# Patient Record
(demographics unavailable — no encounter records)

---

## 2017-08-15 NOTE — RADRPT
PROCEDURE:   US Abdomen (right upper quadrant). 

 

CLINICAL INDICATION:  Abdominal pain.

 

TECHNIQUE:   Multiple real-time longitudinal and transverse images of the right upper quadrant of th
e abdomen were acquired utilizing a curved array transducer. Images were reviewed on a high-resoluti
on PACS workstation. 

 

COMPARISON:   None 

 

FINDINGS:

 

The liver is normal in size and echogenicity without focal mass or intrahepatic biliary dilatation. 
 The gallbladder is normal.  There is no pericholecystic fluid or gallbladder wall thickening or gal
lstones. No intra or extrahepatic biliary dilatation is seen.  The common bile duct measures 2.9 mm 
in maximal dimension.  The pancreas is not well visualized.  No free fluid is identified.

 

The right kidney measures 10.7 cm in length.  There is normal echogenicity within the right kidney. 
 There is no perinephric fluid collection.  No hydronephrosis, mass, or calculus is seen.   

 

IMPRESSION:

1.  The pancreas is not well visualized.  Otherwise unremarkable right upper quadrant ultrasound.  

 

RPTAT: HH

_____________________________________________ 

.Chela Menchaca MD, MD           Date    Time 

Electronically viewed and signed by .Chela Menchaca MD, MD on 08/15/2017 20:58 

 

D:  08/15/2017 20:58  T:  08/15/2017 20:58

.N/

## 2017-08-15 NOTE — ERD
ER Documentation


Chief Complaint


Date/Time


DATE: 8/15/17 


TIME: 22:52


Chief Complaint


LUQ x 3 days, worse today "burning" nausea -vomit/diarrhea





HPI


Patient is a 48-year-old female with a past medical history of gastritis who 

presents to the emergency department for concerns of epigastric and left upper 

quadrant pain 3 days.  Patient states pain was worse today.  Patient describes 

the pain to be burning in nature.  Patient reports nausea however she denies 

any vomiting or diarrhea.  Patient denies any radiation of the pain.  Patient 

denies any fevers, chills, chest pain, shortness breath, left upper extremity 

pain, diaphoresis or loss of consciousness.  Patient does report eating fried 

and spicy foods.  Patient denies seeing a GI specialist.  Patient denies any 

endoscopies or colonoscopies.





ROS


All systems reviewed and are negative except as per history of present illness.





Medications


Home Meds


Active Scripts


Famotidine* (Pepcid*) 20 Mg Tablet, 20 MG PO BID for 30 Days, TAB


   Prov:RAFA ARIZMENDI PA-C         8/15/17


Hydrocodone/Acetaminophen (Norco 5-325 Tablet) 1 Each Tablet, 1 TAB PO Q6H Y 

for PAIN, #7 TAB


   Prov:RAFA ARIZMENDI PA-C         8/15/17


Prednisone* (Prednisone*) 20 Mg Tab, 40 MG PO DAILY for 4 Days, TAB


   Prov:SHELBY DESHPANDE MD         16


Hydrocodone/Acetaminophen (Norco 5-325 Tablet) 1 Each Tablet, 1 TAB PO Q6H Y 

for PAIN, #15 TAB


   Prov:SHELBY DESHPANDE MD         16


Amoxicillin/Potassium Clav (Amox-Clav 875-125 mg Tablet) 875-125 mg Tab, 1 TAB 

PO BID for 10 Days, #14 TAB


   Prov:SHELBY DESHPANDE MD         16


Reported Medications


Flurazepam Hcl (Dalmane) 30 Mg Capsule


   6/17/10


Tramadol Hcl* (Ultram*) 50 Mg Tablet, 0 Refills


   6/17/10





Allergies


Allergies:  


Coded Allergies:  


     No Known Allergies (Verified  Allergy, Mild, 14)


     No Known Drug Allergies (Verified  Allergy, Mild, 12)





PMhx/Soc


History of Surgery:  Yes (C SECTIONX2, PARTIAL HYSTERECTOMY)


Anesthesia Reaction:  No


Hx Respiratory Disorders:  No


Hx Cardiac Disorders:  No


Hx Psychiatric Problems:  No


Hx Miscellaneous Medical Probl:  Yes (anemia)


Hx Alcohol Use:  No


Hx Substance Use:  No


Hx Tobacco Use:  No





Physical Exam


Vitals





Vital Signs








  Date Time  Temp Pulse Resp B/P Pulse Ox O2 Delivery O2 Flow Rate FiO2


 


8/15/17 19:11 98.9 86 18 130/70 97   








Physical Exam


GENERAL: Well-developed, well-nourished female. Appears in no acute distress.  

Taking in full sentences


HEAD: Normocephalic, atraumatic. 


EYES: Pupils are equally reactive bilaterally. EOMs grossly intact. No 

conjunctival erythema. 


ENT: Moist mucous membranes. No uvula deviation. No kissing tonsils. 


NECK: Supple. No meningismus. Normal range of motion of the neck.


LUNG: Clear to auscultation bilaterally. No rhonchi, wheezing, rales or coarse 

breath sounds. 


HEART: Regular rate and rhythm. No murmurs, rubs or gallops.


ABDOMEN: Soft and nondistended.  Tender to palpation in the left upper quadrant 

and epigastric region.  Positive bowel sounds in all four quadrants. No rebound 

tenderness, no guarding. (-) McBurney's point tenderness. No CVA tenderness.


EXTREMITIES: Equal pulses bilaterally. No peripheral clubbing, cyanosis or 

edema. No unilateral leg swelling.


NEUROLOGIC: Alert and oriented. Moving all four extremities without any 

difficulty. Normal speech. Steady gait. 


SKIN: Normal color. Warm and dry. No rashes or lesions.


Result Diagram:  


8/15/17 2005                                                                   

             8/15/17 2005





Results 24 hrs





 Laboratory Tests








Test


  8/15/17


20:05


 


White Blood Count 4.010^3/ul 


 


Red Blood Count 4.4310^6/ul 


 


Hemoglobin 13.3g/dl 


 


Hematocrit 38.8% 


 


Mean Corpuscular Volume 87.6fl 


 


Mean Corpuscular Hemoglobin 30.0pg 


 


Mean Corpuscular Hemoglobin


Concent 34.3g/dl 


 


 


Red Cell Distribution Width 11.9% 


 


Platelet Count 44684^3/UL 


 


Mean Platelet Volume 10.3fl 


 


Neutrophils % 48.4% 


 


Lymphocytes % 42.4% 


 


Monocytes % 6.3% 


 


Eosinophils % 2.3% 


 


Basophils % 0.3% 


 


Nucleated Red Blood Cells % 0.0/100WBC 


 


Neutrophils # (Manual) 1.910^3/ul 


 


Lymphocytes # 1.710^3/ul 


 


Monocytes # 0.310^3/ul 


 


Eosinophils # 0.110^3/ul 


 


Basophils # 0.010^3/ul 


 


Nucleated Red Blood Cells # 0.010^3/ul 


 


Sodium Level 138mmol/L 


 


Potassium Level 3.8mmol/L 


 


Chloride Level 103mmol/L 


 


Carbon Dioxide Level 29mmol/L 


 


Anion Gap 10 


 


Blood Urea Nitrogen 12mg/dl 


 


Creatinine 0.85mg/dl 


 


Glucose Level 99mg/dl 


 


Calcium Level 9.4mg/dl 


 


Total Bilirubin 0.2mg/dl 


 


Direct Bilirubin 0.00mg/dl 


 


Indirect Bilirubin 0.2mg/dl 


 


Aspartate Amino Transf


(AST/SGOT) 48IU/L 


 


 


Alanine Aminotransferase


(ALT/SGPT) 61IU/L 


 


 


Alkaline Phosphatase 118IU/L 


 


Troponin I < 0.012ng/ml 


 


Total Protein 7.0g/dl 


 


Albumin 4.2g/dl 


 


Globulin 2.80g/dl 


 


Albumin/Globulin Ratio 1.50 


 


Lipase 82U/L 








 Current Medications








 Medications


  (Trade)  Dose


 Ordered  Sig/Quita


 Route


 PRN Reason  Start Time


 Stop Time Status Last Admin


Dose Admin


 


 Famotidine


  (Pepcid)  20 mg  ONCE  STAT


 PO


   8/15/17 19:48


 8/15/17 19:50 DC 8/15/17 20:11


 


 


 Miscellaneous


 Medication


  (Gi Cocktail (2))  40 ml  ONCE  STAT


 PO


   8/15/17 19:48


 8/15/17 19:50 DC 8/15/17 20:11


 


 


 Ondansetron HCl


  (Zofran Odt)  4 mg  ONCE  STAT


 ODT


   8/15/17 19:48


 8/15/17 19:50 DC 8/15/17 20:11


 











Procedures/MDM


ED COURSE:


The patient was stable throughout ED course. I kept the patient and/or family 

informed of laboratory and diagnostic imaging results throughout the ED course.

  








DIAGNOSTIC IMAGING:


Read by radiologist.





 Patient: CORTNEY BANUELOS   : 1969   Age: 48  Sex: F                        


 MR #:    [id number]   Acct #:   [id number]    DOS: 08/15/17 1948


 Ordering MD: RAFA ARIZMENDI PA-C   Location:  [facility]   Room/Bed:                 

                           


 








PROCEDURE:   US Abdomen (right upper quadrant). 


 


CLINICAL INDICATION:  Abdominal pain.


 


TECHNIQUE:   Multiple real-time longitudinal and transverse images of the right 

upper quadrant of the abdomen were acquired utilizing a curved array 

transducer. Images were reviewed on a high-resolution PACS workstation. 


 


COMPARISON:   None 


 


FINDINGS:


 


The liver is normal in size and echogenicity without focal mass or intrahepatic 

biliary dilatation.  The gallbladder is normal.  There is no pericholecystic 

fluid or gallbladder wall thickening or gallstones. No intra or extrahepatic 

biliary dilatation is seen.  The common bile duct measures 2.9 mm in maximal 

dimension.  The pancreas is not well visualized.  No free fluid is identified.


 


The right kidney measures 10.7 cm in length.  There is normal echogenicity 

within the right kidney.  There is no perinephric fluid collection.  No 

hydronephrosis, mass, or calculus is seen.   


 


IMPRESSION:


1.  The pancreas is not well visualized.  Otherwise unremarkable right upper 

quadrant ultrasound.  


 


RPTAT: HH


_____________________________________________ 


.Chela Menchaca MD, MD           Date    Time 


Electronically viewed and signed by .Chela Menchaca MD, MD on 08/15/2017 20:

58 


 


D:  08/15/2017 20:58  T:  08/15/2017 20:58


.N/





CC: RAFA ARIZMENDI PA-C








MEDICATIONS GIVEN: 


GI cocktail, Pepcid, Zofran


Patient tolerated medication well with no adverse reactions. Patient reported 

improvement in pain. 





MEDICAL DECISION MAKING: 


This is a 48-year-old female who presents with epigastric pain and left upper 

quadrant pain 3 days with nausea.. Vital signs were reviewed. Patient is 

afebrile. CBC showed no evidence of systemic infection or severe anemia.  CMP 

showed no evidence of electrolyte abnormalities, severe acidosis, alkalosis, 

renal failure, or liver disease. Lipase showed no evidence of acute 

pancreatitis.  Troponin was negative.  Gallbladder ultrasound showed  The 

pancreas is not well visualized.  Otherwise unremarkable right upper quadrant 

ultrasound.  atient was given a GI cocktail here in the emergency department.  

Patient did report improvement in pain.  At this time, patient presentation is 

most consistent with epigastric pain of unknown etiology.  Unable to rule out 

any ulcers at this time.  Patient will need to follow-up with a GI specialist 

for an endoscopy on an outpatient basis.  Low suspicion for acute coronary 

syndrome, AAA, mesenteric ischemia, lower lobe pneumonia, DKA, bowel obstruction

, bowel perforation, cholecystitis, choledocholithiasis, ascending cholangitis, 

hepatic abscess, pancreatitis, nephrolithiasis, constipation, pregnancy, 

ectopic pregnancy. 





PRESCRIPTIONS: 


Pepcid, Norco





DISCHARGE:


At this time, patient is stable for discharge and outpatient management.  She 

was given a copy of all imaging studies and blood work obtained today.  Patient 

was advised to follow-up with a GI specialist.  Referral information provided.  

Patient was advised to avoid any fatty, fried, acidic foods.  Patient advised 

to avoid NSAIDs.  Patient advised to avoid alcohol.  I have instructed the 

patient to follow-up with his/her primary care physician in 1-2 days. I have 

instructed the patient to promptly return to the ER at any time for any new or 

worsening symptoms including increased pain, nausea, vomiting, diarrhea, fever, 

weakness or LOC. The patient and/or family expressed understanding of and 

agreement with this plan. All questions were answered. Home care instructions 

were provided. 








Disclaimer: Inadvertent spelling and grammatical errors are likely due to EHR/

dictation software use and do not reflect on the overall quality of patient 

care. Also, please note that the electronic time recorded on this note does not 

necessarily reflect the actual time of the patient encounter.





Departure


Diagnosis:  


 Primary Impression:  


 Epigastric abdominal pain


Condition:  Stable


Patient Instructions:  Epigastric Pain (Uncertain Cause)


Referrals:  


ELOISA SUE MD, RAHUL K. CHITAYAT, RON DESAI, SALEEM A MD EPHRAIM,HILTON KONG MD








[facility]


YOU HAVE RECEIVED A MEDICAL SCREENING EXAM AND THE RESULTS INDICATE THAT YOU DO 

NOT HAVE A CONDITION THAT REQUIRES URGENT TREATMENT IN THE EMERGENCY DEPARTMENT.





FURTHER EVALUATION AND TREATMENT OF YOUR CONDITION CAN WAIT UNTIL YOU ARE SEEN 

IN YOUR DOCTORS OFFICE WITHIN THE NEXT 1-2 DAYS. IT IS YOUR RESPONSIBILITY TO 

MAKE AN APPOINTMENT FOR FOLOW-UP CARE.





IF YOU HAVE A PRIMARY DOCTOR


--you should call your primary doctor and schedule an appointment





IF YOU DO NOT HAVE A PRIMARY DOCTOR YOU CAN CALL OUR PHYSICIAN REFERRAL HOTLINE 

AT


 [phone] 





IF YOU CAN NOT AFFORD TO SEE A PHYSICIAN YOU CAN CHOSE FROM THE FOLLOWING 

[facility] CLINICS





[facility] [phone] [address] [facility]. [facility] [phone] [address] [facility]. [facility] [phone] [address] [facility]. [facility] [phone] [address] [facility]. [facility] [phone] [address] [facility]. [facility]. [phone] [address] [facility]. [facility]


YOU HAVE RECEIVED A MEDICAL SCREENING EXAM AND THE RESULTS INDICATE THAT YOU DO 

NOT HAVE A CONDITION THAT REQUIRES URGENT TREATMENT IN THE EMERGENCY DEPARTMENT.





FURTHER EVALUATION AND TREATMENT OF YOUR CONDITION CAN WAIT UNTIL YOU ARE SEEN 

IN YOUR DOCTORS OFFICE WITHIN THE NEXT 1-2 DAYS. IT IS YOUR RESPONSIBILITY TO 

MAKE AN APPOINTMENT FOR FOLOW-UP CARE.





IF YOU HAVE A PRIMARY DOCTOR


--you should call your primary doctor and schedule and appointment





IF YOU DO NOT HAVE A PRIMARY DOCTOR YOU CAN CALL OUR PHYSICIAN REFERRAL HOTLINE 

AT [phone].





IF YOU CAN NOT AFFORD TO SEE A PHYSICIAN YOU CAN CHOSE FROM THE FOLLOWING 

Critical access hospital INSTITUTIONS:





[facility]


[address] [place] [address]





[facility]


[address] [place] [address]





[facility]


[address] [place] [address]





Additional Instructions:  


Call your primary care doctor TOMORROW for an appointment during the next 1-2 

days.See the doctor sooner or return here if your condition worsens before your 

appointment time.





Follow-up with a GI specialist for further management of your epigastric pain.  

You may need an endoscopy on an outpatient basis.











RAFA ARIZMENDI PA-C Aug 15, 2017 22:55

## 2017-11-14 NOTE — RADRPT
PROCEDURE: XR Right Hand

 

CLINICAL INDICATION:  1-3 numb digits

 

TECHNIQUE: PA, oblique, and lateral radiographs were submitted.

 

COMPARISON:  None

 

FINDINGS:

 

Osseous structures:  appear well mineralized and intact with no fracture or destructive process iden
tified.

 

Joint spaces:  are well maintained, with no significant spurring, erosion or joint effusion evident.

 

Soft tissues:  appear unremarkable.

 

IMPRESSION: Unremarkable right hand.

_____________________________________________ 

Physician Ronit           Date    Time 

Electronically viewed and signed by JYOTSNA Gonzalez Physician on 11/14/2017 22:04 

 

D:  11/14/2017 22:04  T:  11/14/2017 22:04

/

## 2017-11-14 NOTE — ERD
ER Documentation


Chief Complaint


Chief Complaint


RIGHT FINGER & hand NUMBNESS X 2 DAYS,REPORTS RT NECK PX





HPI


48-year-old presents to the emergency department complaining of numbness in her 

thumb second digit and third digit and hand pain for the past 2 days.  Patient 

does admit to having neck pain.  She denies any trauma, fevers, chest pain 

shortness of breath.





ROS


All systems reviewed and are negative except as per history of present illness.





Medications


Home Meds


Active Scripts


Ibuprofen* (Motrin*) 400 Mg Tab, 400 MG PO Q6H Y for PAIN AND OR ELEVATED TEMP, 

#30 TAB


   Prov:ROSIE TOLEDO PA-C         11/14/17


Famotidine* (Pepcid*) 20 Mg Tablet, 20 MG PO BID for 30 Days, TAB


   Prov:RAFA ARIZEMNDI PA-C         8/15/17


Hydrocodone/Acetaminophen (Norco 5-325 Tablet) 1 Each Tablet, 1 TAB PO Q6H Y 

for PAIN, #7 TAB


   Prov:RAFA ARIZMENDI PA-C         8/15/17


Prednisone* (Prednisone*) 20 Mg Tab, 40 MG PO DAILY for 4 Days, TAB


   Prov:SHELBY DESHPANDE MD         9/26/16


Hydrocodone/Acetaminophen (Norco 5-325 Tablet) 1 Each Tablet, 1 TAB PO Q6H Y 

for PAIN, #15 TAB


   Prov:SHELBY DESHPANDE MD         9/26/16


Amoxicillin/Potassium Clav (Amox-Clav 875-125 mg Tablet) 875-125 mg Tab, 1 TAB 

PO BID for 10 Days, #14 TAB


   Prov:SHELBY DESHPANDE MD         9/26/16


Reported Medications


Flurazepam Hcl (Dalmane) 30 Mg Capsule


   6/17/10


Tramadol Hcl* (Ultram*) 50 Mg Tablet, 0 Refills


   6/17/10





Allergies


Allergies:  


Coded Allergies:  


     No Known Allergies (Verified  Allergy, Mild, 11/14/17)





PMhx/Soc


History of Surgery:  Yes (C SECTIONX2, PARTIAL HYSTERECTOMY)


Anesthesia Reaction:  No


Hx Respiratory Disorders:  No


Hx Cardiac Disorders:  No


Hx Psychiatric Problems:  No


Hx Miscellaneous Medical Probl:  Yes (anemia)


Hx Alcohol Use:  No


Hx Substance Use:  No


Hx Tobacco Use:  No


Smoking Status:  Never smoker





Physical Exam


Vitals





Vital Signs








  Date Time  Temp Pulse Resp B/P Pulse Ox O2 Delivery O2 Flow Rate FiO2


 


11/14/17 19:46 98.0 100 18 135/81 99   








Physical Exam


General: WD/WN, in no apparent distress, non-toxic appearing


HENT: NC/AT


Eyes: Conjunctiva normal


Neck: Supple. TTP on cervical paraspinal muscles


Pulm: Clear to auscultation, normal labored breathing; no wheezing/rales/

rhonchi heard


CV: Good capillary refill


GI: Non-distended, no guarding


Back: No masses


Ext: equal hand 


Neuro: Moves on all fours


Skin: intact


Psych: Normal mood


Results 24 hrs





 Current Medications








 Medications


  (Trade)  Dose


 Ordered  Sig/Quita


 Route


 PRN Reason  Start Time


 Stop Time Status Last Admin


Dose Admin


 


 Ibuprofen


  (Motrin)  600 mg  ONCE  STAT


 PO


   11/14/17 21:16


 11/14/17 21:17 DC  


 











Procedures/MDM


This is a 48-year-old female presenting to the emergency department complaining 

of numbness in her thumb, second digit and third digit of her right hand with 

hand pain for the past 2 days.Differentials include C6 C7 cervical radiculopathy

, other nerve impingement.  Discussed with patient that it is best for her to 

follow-up with her primary care physician to get a referral to see an 

orthopedist, physical therapy and get an MRI of the cervical spine.  Patient is 

requesting an x-ray of her right hand, there was no evidence of fracture 

dislocation.  Patient was given ibuprofen for pain, I have discussed with her 

to return to the ER for any worsening signs or symptoms.  She understands and 

agrees with this plan





Departure


Diagnosis:  


 Primary Impression:  


 Numbness


Condition:  Stable


Patient Instructions:  What Is Carpal Tunnel Syndrome (CTS)?, Radiculopathy, 

Cervical, Paraesthesias





Additional Instructions:  


FOLLOW UP WITH YOUR PRIMARY CARE PHYSICIAN TOMORROW.Return to this facility if 

you are not improving as expected.





Return to this facility if you are not improving as expected.





Take all medicines as directed.











ROSIE TOLEDO PA-C Nov 14, 2017 22:25